# Patient Record
Sex: MALE | Race: WHITE | NOT HISPANIC OR LATINO
[De-identification: names, ages, dates, MRNs, and addresses within clinical notes are randomized per-mention and may not be internally consistent; named-entity substitution may affect disease eponyms.]

---

## 2018-12-22 ENCOUNTER — TRANSCRIPTION ENCOUNTER (OUTPATIENT)
Age: 28
End: 2018-12-22

## 2019-12-25 ENCOUNTER — EMERGENCY (EMERGENCY)
Facility: HOSPITAL | Age: 29
LOS: 1 days | Discharge: HOME | End: 2019-12-25
Attending: EMERGENCY MEDICINE
Payer: COMMERCIAL

## 2019-12-25 VITALS
OXYGEN SATURATION: 100 % | TEMPERATURE: 98 F | SYSTOLIC BLOOD PRESSURE: 117 MMHG | RESPIRATION RATE: 18 BRPM | DIASTOLIC BLOOD PRESSURE: 83 MMHG | HEART RATE: 77 BPM

## 2019-12-25 VITALS
TEMPERATURE: 100 F | OXYGEN SATURATION: 99 % | WEIGHT: 235.01 LBS | HEART RATE: 110 BPM | SYSTOLIC BLOOD PRESSURE: 140 MMHG | RESPIRATION RATE: 20 BRPM | DIASTOLIC BLOOD PRESSURE: 95 MMHG

## 2019-12-25 LAB
ALBUMIN SERPL ELPH-MCNC: 4.8 G/DL — SIGNIFICANT CHANGE UP (ref 3.3–5)
ALP SERPL-CCNC: 124 U/L — HIGH (ref 40–120)
ALT FLD-CCNC: 68 U/L — HIGH (ref 10–45)
ANION GAP SERPL CALC-SCNC: 14 MMOL/L — SIGNIFICANT CHANGE UP (ref 5–17)
AST SERPL-CCNC: 22 U/L — SIGNIFICANT CHANGE UP (ref 10–40)
BASOPHILS # BLD AUTO: 0 K/UL — SIGNIFICANT CHANGE UP (ref 0–0.2)
BASOPHILS NFR BLD AUTO: 0 % — SIGNIFICANT CHANGE UP (ref 0–2)
BILIRUB SERPL-MCNC: 1.1 MG/DL — SIGNIFICANT CHANGE UP (ref 0.2–1.2)
BUN SERPL-MCNC: 22 MG/DL — SIGNIFICANT CHANGE UP (ref 7–23)
CALCIUM SERPL-MCNC: 9.8 MG/DL — SIGNIFICANT CHANGE UP (ref 8.4–10.5)
CHLORIDE SERPL-SCNC: 96 MMOL/L — SIGNIFICANT CHANGE UP (ref 96–108)
CO2 SERPL-SCNC: 25 MMOL/L — SIGNIFICANT CHANGE UP (ref 22–31)
CREAT SERPL-MCNC: 1.07 MG/DL — SIGNIFICANT CHANGE UP (ref 0.5–1.3)
EOSINOPHIL # BLD AUTO: 0 K/UL — SIGNIFICANT CHANGE UP (ref 0–0.5)
EOSINOPHIL NFR BLD AUTO: 0 % — SIGNIFICANT CHANGE UP (ref 0–6)
GLUCOSE SERPL-MCNC: 120 MG/DL — HIGH (ref 70–99)
HCT VFR BLD CALC: 46.9 % — SIGNIFICANT CHANGE UP (ref 39–50)
HGB BLD-MCNC: 14.8 G/DL — SIGNIFICANT CHANGE UP (ref 13–17)
LYMPHOCYTES # BLD AUTO: 4.37 K/UL — HIGH (ref 1–3.3)
LYMPHOCYTES # BLD AUTO: 44 % — SIGNIFICANT CHANGE UP (ref 13–44)
MANUAL SMEAR VERIFICATION: SIGNIFICANT CHANGE UP
MCHC RBC-ENTMCNC: 28.6 PG — SIGNIFICANT CHANGE UP (ref 27–34)
MCHC RBC-ENTMCNC: 31.6 GM/DL — LOW (ref 32–36)
MCV RBC AUTO: 90.5 FL — SIGNIFICANT CHANGE UP (ref 80–100)
MONOCYTES # BLD AUTO: 0.4 K/UL — SIGNIFICANT CHANGE UP (ref 0–0.9)
MONOCYTES NFR BLD AUTO: 4 % — SIGNIFICANT CHANGE UP (ref 2–14)
NEUTROPHILS # BLD AUTO: 4.77 K/UL — SIGNIFICANT CHANGE UP (ref 1.8–7.4)
NEUTROPHILS NFR BLD AUTO: 48 % — SIGNIFICANT CHANGE UP (ref 43–77)
NRBC # BLD: 0 /100 — SIGNIFICANT CHANGE UP (ref 0–0)
PLAT MORPH BLD: NORMAL — SIGNIFICANT CHANGE UP
PLATELET # BLD AUTO: 184 K/UL — SIGNIFICANT CHANGE UP (ref 150–400)
POTASSIUM SERPL-MCNC: 4.2 MMOL/L — SIGNIFICANT CHANGE UP (ref 3.5–5.3)
POTASSIUM SERPL-SCNC: 4.2 MMOL/L — SIGNIFICANT CHANGE UP (ref 3.5–5.3)
PROT SERPL-MCNC: 8.5 G/DL — HIGH (ref 6–8.3)
RBC # BLD: 5.18 M/UL — SIGNIFICANT CHANGE UP (ref 4.2–5.8)
RBC # FLD: 13.7 % — SIGNIFICANT CHANGE UP (ref 10.3–14.5)
RBC BLD AUTO: SIGNIFICANT CHANGE UP
S PYO AG SPEC QL IA: NEGATIVE — SIGNIFICANT CHANGE UP
SODIUM SERPL-SCNC: 135 MMOL/L — SIGNIFICANT CHANGE UP (ref 135–145)
VARIANT LYMPHS # BLD: 4 % — SIGNIFICANT CHANGE UP (ref 0–6)
WBC # BLD: 9.93 K/UL — SIGNIFICANT CHANGE UP (ref 3.8–10.5)
WBC # FLD AUTO: 9.93 K/UL — SIGNIFICANT CHANGE UP (ref 3.8–10.5)

## 2019-12-25 PROCEDURE — 96361 HYDRATE IV INFUSION ADD-ON: CPT

## 2019-12-25 PROCEDURE — 80053 COMPREHEN METABOLIC PANEL: CPT

## 2019-12-25 PROCEDURE — 87081 CULTURE SCREEN ONLY: CPT

## 2019-12-25 PROCEDURE — 85027 COMPLETE CBC AUTOMATED: CPT

## 2019-12-25 PROCEDURE — 99284 EMERGENCY DEPT VISIT MOD MDM: CPT | Mod: 25

## 2019-12-25 PROCEDURE — 96374 THER/PROPH/DIAG INJ IV PUSH: CPT

## 2019-12-25 PROCEDURE — 87880 STREP A ASSAY W/OPTIC: CPT

## 2019-12-25 PROCEDURE — 99284 EMERGENCY DEPT VISIT MOD MDM: CPT

## 2019-12-25 RX ORDER — SODIUM CHLORIDE 9 MG/ML
2000 INJECTION, SOLUTION INTRAVENOUS ONCE
Refills: 0 | Status: COMPLETED | OUTPATIENT
Start: 2019-12-25 | End: 2019-12-25

## 2019-12-25 RX ORDER — ACETAMINOPHEN 500 MG
975 TABLET ORAL ONCE
Refills: 0 | Status: COMPLETED | OUTPATIENT
Start: 2019-12-25 | End: 2019-12-25

## 2019-12-25 RX ORDER — KETOROLAC TROMETHAMINE 30 MG/ML
15 SYRINGE (ML) INJECTION ONCE
Refills: 0 | Status: DISCONTINUED | OUTPATIENT
Start: 2019-12-25 | End: 2019-12-25

## 2019-12-25 RX ADMIN — SODIUM CHLORIDE 2000 MILLILITER(S): 9 INJECTION, SOLUTION INTRAVENOUS at 14:40

## 2019-12-25 RX ADMIN — Medication 15 MILLIGRAM(S): at 14:40

## 2019-12-25 RX ADMIN — Medication 975 MILLIGRAM(S): at 14:40

## 2019-12-25 RX ADMIN — Medication 15 MILLIGRAM(S): at 13:43

## 2019-12-25 RX ADMIN — SODIUM CHLORIDE 2000 MILLILITER(S): 9 INJECTION, SOLUTION INTRAVENOUS at 11:10

## 2019-12-25 RX ADMIN — Medication 975 MILLIGRAM(S): at 13:43

## 2019-12-25 NOTE — ED PROVIDER NOTE - NSFOLLOWUPINSTRUCTIONS_ED_ALL_ED_FT
Please return immediately if you have worsening sore throat, are unable to eat or drink, unable to move your neck or further concerns  You can take tylenol 975mg every 8 hours and motrin 600mg every 6-8 hours  Please avoid any contact sports until you are cleared by your doctor  Please return if you start to drool

## 2019-12-25 NOTE — ED PROVIDER NOTE - OBJECTIVE STATEMENT
Attending Natasha Escobar: 30 y/o male without sig pmh presenting with sore throat. pt has been having symptoms last 7-8 days. was tested for mono and reportedly positive. pt states also have u/s have spleen and reportedly enlarged. pt has been taking prednisone for sore throat. reportedly had elevated lft's. has not been takingtylenol and motrin. pt states worsening pain and fatigue as well as sore throat. over last 24 hours difficulty drinking secondary to pain. no sick contacts. no joint pain. no cough

## 2019-12-25 NOTE — ED PROVIDER NOTE - ATTENDING CONTRIBUTION TO CARE
Attending MD Natasha Escobar:  I personally have seen and examined this patient.  Resident note reviewed and agree on plan of care and except where noted.  See HPI, PE, and MDM for details.

## 2019-12-25 NOTE — ED PROVIDER NOTE - CLINICAL SUMMARY MEDICAL DECISION MAKING FREE TEXT BOX
Attending Natasha Escobar: 21 y/o male recently diagnosed with mono on steroids presenting with sore throat and not feeling well. upon arrival pt febrile and initially tachcardic. on exam op pink, uvula midline, no trismus and pt handlling secretions. unlikely ludwigs. no evidence of pta on exam or pocus. pt does have lymphadenopathy. was recently diagonsed with EBV which is likley causing symptoms. consider developing pharyngitis. pt given antipyretic and hydration. onsteroids currently. felt improved in the ed. given close return precautions for any worsneing sore throat, hoarseness or neck pain. less likely deep tissue abscess as pt able to range neck

## 2019-12-25 NOTE — ED PROVIDER NOTE - PHYSICAL EXAMINATION
Attending Natasha Escobar: Gen: NAD, heent: atrauamtic, eomi, perrla, dry mucous membranes, op pink, b/l tonsilar erythema with exudate, no trismus. no tongue elevation, op pink, uvula midline, neck; nttp, no nuchal rigidity, chest: nttp, no crepitus, cv: rrr, no murmurs, lungs: ctab, abd: soft, nontender, nondistended, no peritoneal signs, +BS, no guarding, ext: wwp, neg homans, skin: no rash, neuro: awake and alert, following commands, speech clear, sensation and strength intact, no focal deficits

## 2019-12-25 NOTE — ED PROVIDER NOTE - PATIENT PORTAL LINK FT
You can access the FollowMyHealth Patient Portal offered by [facility] by registering at the following website: [email or web address]. By joining [facility]’s FollowMyHealth portal, you will also be able to view your health information using other applications (apps) compatible with our system.

## 2019-12-25 NOTE — ED PROVIDER NOTE - CARE PROVIDER_API CALL
Eamon Walsh (MD)  Otolaryngology  [address] [place] [address], [facility] [address]  [place] [address]  Phone: [phone]  Fax: [phone]  Follow Up Time:

## 2019-12-25 NOTE — ED PROVIDER NOTE - PROGRESS NOTE DETAILS
Attending Natasha Escobar: pt feeling better. handling secretions. lft's improved from prior. will give close return precautions for any worsening neck pain, difficulty swallowing or neck swelling

## 2019-12-25 NOTE — ED ADULT NURSE NOTE - OBJECTIVE STATEMENT
30 y/o male c/o sore throat x 8-9 days with earache left > right.   Pt reports he was recently diagnosed with mono.   No fever, no chills, no chest pain, no SOB, no abd pain.  Respiration easy and non labored.  No acute respiratory distress noted.

## 2020-12-22 PROBLEM — Z00.00 ENCOUNTER FOR PREVENTIVE HEALTH EXAMINATION: Status: ACTIVE | Noted: 2020-12-22

## 2021-01-15 ENCOUNTER — APPOINTMENT (OUTPATIENT)
Dept: SURGERY | Facility: CLINIC | Age: 31
End: 2021-01-15
Payer: COMMERCIAL

## 2021-01-15 VITALS
BODY MASS INDEX: 32.51 KG/M2 | HEIGHT: 72 IN | TEMPERATURE: 97.9 F | SYSTOLIC BLOOD PRESSURE: 128 MMHG | OXYGEN SATURATION: 99 % | DIASTOLIC BLOOD PRESSURE: 89 MMHG | HEART RATE: 72 BPM | RESPIRATION RATE: 16 BRPM | WEIGHT: 240 LBS

## 2021-01-15 DIAGNOSIS — K62.5 HEMORRHAGE OF ANUS AND RECTUM: ICD-10-CM

## 2021-01-15 DIAGNOSIS — Z80.0 FAMILY HISTORY OF MALIGNANT NEOPLASM OF DIGESTIVE ORGANS: ICD-10-CM

## 2021-01-15 DIAGNOSIS — Z86.69 PERSONAL HISTORY OF OTHER DISEASES OF THE NERVOUS SYSTEM AND SENSE ORGANS: ICD-10-CM

## 2021-01-15 DIAGNOSIS — Z78.9 OTHER SPECIFIED HEALTH STATUS: ICD-10-CM

## 2021-01-15 DIAGNOSIS — Z83.79 FAMILY HISTORY OF OTHER DISEASES OF THE DIGESTIVE SYSTEM: ICD-10-CM

## 2021-01-15 DIAGNOSIS — Z83.3 FAMILY HISTORY OF DIABETES MELLITUS: ICD-10-CM

## 2021-01-15 PROCEDURE — 46600 DIAGNOSTIC ANOSCOPY SPX: CPT

## 2021-01-15 PROCEDURE — 99213 OFFICE O/P EST LOW 20 MIN: CPT | Mod: 25

## 2021-01-15 PROCEDURE — 99072 ADDL SUPL MATRL&STAF TM PHE: CPT

## 2021-01-15 RX ORDER — CHROMIUM 200 MCG
TABLET ORAL
Refills: 0 | Status: ACTIVE | COMMUNITY

## 2021-01-15 RX ORDER — SERTRALINE HYDROCHLORIDE 25 MG/1
TABLET, FILM COATED ORAL
Refills: 0 | Status: ACTIVE | COMMUNITY

## 2021-01-15 NOTE — CONSULT LETTER
[Dear  ___] : Dear  [unfilled], [Consult Letter:] : I had the pleasure of evaluating your patient, [unfilled]. [Please see my note below.] : Please see my note below. [Consult Closing:] : Thank you very much for allowing me to participate in the care of this patient.  If you have any questions, please do not hesitate to contact me. [Sincerely,] : Sincerely, [FreeTextEntry3] : Betito Crawley M.D., F.A.C.S, F.A.S.C.R.S [DrDawood  ___] : Dr. GAMINO

## 2021-01-15 NOTE — PHYSICAL EXAM
[Abdomen Tenderness] : ~T No ~M abdominal tenderness [Wheezing] : no wheezing was heard [Normal Rate and Rhythm] : normal rate and rhythm [No Rash or Lesion] : No rash or lesion [Alert] : alert [Calm] : calm [de-identified] : Perianal inspection reveals no evidence of fissure fistula or abscess.  There are no enlarged external hemorrhoids.  Digital rectal examination reveals no focal tenderness.  Anoscopy reveals large friable internal hemorrhoids largest in the right posterior and left lateral positions.  Distal rectal mucosa is otherwise normal. [de-identified] : nl [de-identified] : nad

## 2021-01-15 NOTE — ASSESSMENT
[FreeTextEntry1] : In summary the patient has intermittent rectal bleeding which is seems to be resolving.  He also had anal pain which resolved.  His bleeding is likely from his internal hemorrhoids and I recommended rubber band ligations if his bleeding persists.  He should also undergo screening colonoscopy by Dr. Foster as he has a strong family history of colorectal cancer and his last colonoscopy was 7 years ago.

## 2021-01-15 NOTE — REASON FOR VISIT
[Consultation] : a consultation visit [FreeTextEntry1] : referred by Dr. Mejia Foster for rectal bleeding.

## 2021-01-15 NOTE — HISTORY OF PRESENT ILLNESS
[FreeTextEntry1] : Juan is a 29 y/o male here for consultation visit for rectal bleeding on TP since beginning of December. Reports has BMs twice daily varying in form - pain during December but resolved.\par \par Colonoscopy from 2014 demonstrated normal terminal ileum. Inflammation in the rectum. Hemorrhoids.

## 2021-11-24 NOTE — ED PROVIDER NOTE - CARE PROVIDERS DIRECT ADDRESSES
[email or web address] Patient requests all Lab, Cardiology, and Radiology Results on their Discharge Instructions

## 2023-06-06 ENCOUNTER — INPATIENT (INPATIENT)
Facility: HOSPITAL | Age: 33
LOS: 0 days | Discharge: HOME | DRG: 343 | End: 2023-06-07
Attending: SURGERY | Admitting: SURGERY
Payer: COMMERCIAL

## 2023-06-06 ENCOUNTER — TRANSCRIPTION ENCOUNTER (OUTPATIENT)
Age: 33
End: 2023-06-06

## 2023-06-06 VITALS
HEART RATE: 73 BPM | TEMPERATURE: 98 F | OXYGEN SATURATION: 97 % | RESPIRATION RATE: 17 BRPM | SYSTOLIC BLOOD PRESSURE: 128 MMHG | DIASTOLIC BLOOD PRESSURE: 86 MMHG

## 2023-06-06 DIAGNOSIS — K35.80 UNSPECIFIED ACUTE APPENDICITIS: ICD-10-CM

## 2023-06-06 LAB
ALBUMIN SERPL ELPH-MCNC: 5.6 G/DL — HIGH (ref 3.3–5)
ALP SERPL-CCNC: 55 U/L — SIGNIFICANT CHANGE UP (ref 40–120)
ALT FLD-CCNC: 13 U/L — SIGNIFICANT CHANGE UP (ref 10–45)
ANION GAP SERPL CALC-SCNC: 18 MMOL/L — HIGH (ref 5–17)
APTT BLD: 35.2 SEC — SIGNIFICANT CHANGE UP (ref 27.5–35.5)
AST SERPL-CCNC: 22 U/L — SIGNIFICANT CHANGE UP (ref 10–40)
BASE EXCESS BLDV CALC-SCNC: -1.4 MMOL/L — SIGNIFICANT CHANGE UP (ref -2–3)
BASOPHILS # BLD AUTO: 0.02 K/UL — SIGNIFICANT CHANGE UP (ref 0–0.2)
BASOPHILS NFR BLD AUTO: 0.3 % — SIGNIFICANT CHANGE UP (ref 0–2)
BILIRUB SERPL-MCNC: 0.9 MG/DL — SIGNIFICANT CHANGE UP (ref 0.2–1.2)
BLD GP AB SCN SERPL QL: NEGATIVE — SIGNIFICANT CHANGE UP
BUN SERPL-MCNC: 16 MG/DL — SIGNIFICANT CHANGE UP (ref 7–23)
CA-I SERPL-SCNC: 1.23 MMOL/L — SIGNIFICANT CHANGE UP (ref 1.15–1.33)
CALCIUM SERPL-MCNC: 9.7 MG/DL — SIGNIFICANT CHANGE UP (ref 8.4–10.5)
CHLORIDE BLDV-SCNC: 101 MMOL/L — SIGNIFICANT CHANGE UP (ref 96–108)
CHLORIDE SERPL-SCNC: 100 MMOL/L — SIGNIFICANT CHANGE UP (ref 96–108)
CO2 BLDV-SCNC: 28 MMOL/L — HIGH (ref 22–26)
CO2 SERPL-SCNC: 20 MMOL/L — LOW (ref 22–31)
CREAT SERPL-MCNC: 0.99 MG/DL — SIGNIFICANT CHANGE UP (ref 0.5–1.3)
EGFR: 103 ML/MIN/1.73M2 — SIGNIFICANT CHANGE UP
EOSINOPHIL # BLD AUTO: 0.06 K/UL — SIGNIFICANT CHANGE UP (ref 0–0.5)
EOSINOPHIL NFR BLD AUTO: 0.8 % — SIGNIFICANT CHANGE UP (ref 0–6)
GAS PNL BLDV: 134 MMOL/L — LOW (ref 136–145)
GAS PNL BLDV: SIGNIFICANT CHANGE UP
GAS PNL BLDV: SIGNIFICANT CHANGE UP
GLUCOSE BLDV-MCNC: 95 MG/DL — SIGNIFICANT CHANGE UP (ref 70–99)
GLUCOSE SERPL-MCNC: 92 MG/DL — SIGNIFICANT CHANGE UP (ref 70–99)
HCO3 BLDV-SCNC: 26 MMOL/L — SIGNIFICANT CHANGE UP (ref 22–29)
HCT VFR BLD CALC: 48 % — SIGNIFICANT CHANGE UP (ref 39–50)
HCT VFR BLDA CALC: 49 % — SIGNIFICANT CHANGE UP (ref 39–51)
HGB BLD CALC-MCNC: 16.4 G/DL — SIGNIFICANT CHANGE UP (ref 12.6–17.4)
HGB BLD-MCNC: 15.8 G/DL — SIGNIFICANT CHANGE UP (ref 13–17)
IMM GRANULOCYTES NFR BLD AUTO: 0.3 % — SIGNIFICANT CHANGE UP (ref 0–0.9)
INR BLD: 1.03 RATIO — SIGNIFICANT CHANGE UP (ref 0.88–1.16)
LACTATE BLDV-MCNC: 1.7 MMOL/L — SIGNIFICANT CHANGE UP (ref 0.5–2)
LIDOCAIN IGE QN: 23 U/L — SIGNIFICANT CHANGE UP (ref 7–60)
LYMPHOCYTES # BLD AUTO: 2.27 K/UL — SIGNIFICANT CHANGE UP (ref 1–3.3)
LYMPHOCYTES # BLD AUTO: 31.1 % — SIGNIFICANT CHANGE UP (ref 13–44)
MCHC RBC-ENTMCNC: 29.3 PG — SIGNIFICANT CHANGE UP (ref 27–34)
MCHC RBC-ENTMCNC: 32.9 GM/DL — SIGNIFICANT CHANGE UP (ref 32–36)
MCV RBC AUTO: 89.1 FL — SIGNIFICANT CHANGE UP (ref 80–100)
MONOCYTES # BLD AUTO: 0.55 K/UL — SIGNIFICANT CHANGE UP (ref 0–0.9)
MONOCYTES NFR BLD AUTO: 7.5 % — SIGNIFICANT CHANGE UP (ref 2–14)
NEUTROPHILS # BLD AUTO: 4.37 K/UL — SIGNIFICANT CHANGE UP (ref 1.8–7.4)
NEUTROPHILS NFR BLD AUTO: 60 % — SIGNIFICANT CHANGE UP (ref 43–77)
NRBC # BLD: 0 /100 WBCS — SIGNIFICANT CHANGE UP (ref 0–0)
PCO2 BLDV: 55 MMHG — SIGNIFICANT CHANGE UP (ref 42–55)
PH BLDV: 7.29 — LOW (ref 7.32–7.43)
PLATELET # BLD AUTO: 181 K/UL — SIGNIFICANT CHANGE UP (ref 150–400)
PO2 BLDV: 36 MMHG — SIGNIFICANT CHANGE UP (ref 25–45)
POTASSIUM BLDV-SCNC: 5.1 MMOL/L — SIGNIFICANT CHANGE UP (ref 3.5–5.1)
POTASSIUM SERPL-MCNC: 4.7 MMOL/L — SIGNIFICANT CHANGE UP (ref 3.5–5.3)
POTASSIUM SERPL-SCNC: 4.7 MMOL/L — SIGNIFICANT CHANGE UP (ref 3.5–5.3)
PROT SERPL-MCNC: 8.3 G/DL — SIGNIFICANT CHANGE UP (ref 6–8.3)
PROTHROM AB SERPL-ACNC: 12 SEC — SIGNIFICANT CHANGE UP (ref 10.5–13.4)
RBC # BLD: 5.39 M/UL — SIGNIFICANT CHANGE UP (ref 4.2–5.8)
RBC # FLD: 13 % — SIGNIFICANT CHANGE UP (ref 10.3–14.5)
RH IG SCN BLD-IMP: POSITIVE — SIGNIFICANT CHANGE UP
SAO2 % BLDV: 56.6 % — LOW (ref 67–88)
SODIUM SERPL-SCNC: 138 MMOL/L — SIGNIFICANT CHANGE UP (ref 135–145)
WBC # BLD: 7.29 K/UL — SIGNIFICANT CHANGE UP (ref 3.8–10.5)
WBC # FLD AUTO: 7.29 K/UL — SIGNIFICANT CHANGE UP (ref 3.8–10.5)

## 2023-06-06 PROCEDURE — 99285 EMERGENCY DEPT VISIT HI MDM: CPT

## 2023-06-06 RX ORDER — SODIUM CHLORIDE 9 MG/ML
1000 INJECTION INTRAMUSCULAR; INTRAVENOUS; SUBCUTANEOUS ONCE
Refills: 0 | Status: COMPLETED | OUTPATIENT
Start: 2023-06-06 | End: 2023-06-06

## 2023-06-06 RX ORDER — ERTAPENEM SODIUM 1 G/1
1000 INJECTION, POWDER, LYOPHILIZED, FOR SOLUTION INTRAMUSCULAR; INTRAVENOUS ONCE
Refills: 0 | Status: COMPLETED | OUTPATIENT
Start: 2023-06-06 | End: 2023-06-06

## 2023-06-06 RX ORDER — SODIUM CHLORIDE 9 MG/ML
1000 INJECTION, SOLUTION INTRAVENOUS
Refills: 0 | Status: DISCONTINUED | OUTPATIENT
Start: 2023-06-06 | End: 2023-06-07

## 2023-06-06 RX ORDER — ACETAMINOPHEN 500 MG
1000 TABLET ORAL EVERY 6 HOURS
Refills: 0 | Status: DISCONTINUED | OUTPATIENT
Start: 2023-06-06 | End: 2023-06-07

## 2023-06-06 RX ADMIN — SODIUM CHLORIDE 1000 MILLILITER(S): 9 INJECTION INTRAMUSCULAR; INTRAVENOUS; SUBCUTANEOUS at 22:04

## 2023-06-06 RX ADMIN — ERTAPENEM SODIUM 120 MILLIGRAM(S): 1 INJECTION, POWDER, LYOPHILIZED, FOR SOLUTION INTRAMUSCULAR; INTRAVENOUS at 22:04

## 2023-06-06 NOTE — ED PROVIDER NOTE - ATTENDING CONTRIBUTION TO CARE
Private Physician Mitchell Foster pcp,   33y male pmh, Anxiety, No dm,htn,cancer,cad,tobacco,abd surg. Pt comes to ed c/o abd pain onset two days ago. Seen at pmd yesterday. had ct today showing acute appy. Feels mild improved. No fever, anorexia. last meal 1230pm PE WDWN male awake alert normocephalic atraumatic Private Physician Mitchell Foster pcp,   33y male pmh, Anxiety, No dm,htn,cancer,cad,tobacco,abd surg. Pt comes to ed c/o abd pain onset two days ago. Seen at pmd yesterday. had ct today showing acute appy. Feels mild improved. No fever, anorexia. last meal 1230pm PE WDWN male awake alert normocephalic atraumatic Chest clear cv no rubs, gallops or murmurs, Abd mild rlq ttp w mild rebound, neuro no focal defects  Betito Torres MD, Facep

## 2023-06-06 NOTE — ED PROVIDER NOTE - CLINICAL SUMMARY MEDICAL DECISION MAKING FREE TEXT BOX
33-year-old male with no past medical history presenting with abdominal pain x2 days, found to have acute appendicitis on outpatient imaging today; afebrile, hemodynamically stable, mild tenderness right lower quadrant. Will start empiric antibiotics, obtain preop labs, and consult surgery. Patient does not require pain medication at this time: We will continue to reassess

## 2023-06-06 NOTE — ED PROVIDER NOTE - OBJECTIVE STATEMENT
33-year-old male with no past medical history presenting with abdominal pain x2 days.  Localizes to right lower quadrant, went for outpatient imaging today found to have acute appendicitis.  Sent to ER for further evaluation.  Denies any fever/chills, nausea/vomiting/diarrhea, cough, rashes, or changes in urination.  No history of any abdominal surgeries.  Denies any allergies.

## 2023-06-06 NOTE — ED PROVIDER NOTE - PHYSICAL EXAMINATION
Gen: WDWN, NAD, comfortable appearing, afebrile, hemodynamically stable   HEENT: No nasal discharge, mucous membranes moist, no oropharyngeal edema/erythema/exudates   CV: RRR, +S1/S2, no M/R/G, equal b/l radial pulses 2+  Resp: CTAB, no W/R/R, SPO2 >95% on RA, no increased WOB   GI: Abdomen soft non-distended, TTP in RLQ with no rebound/guarding no masses/organomegaly   MSK/Skin: No CVA tenderness, no open wounds, no bruising, no LE edema  Neuro: A&Ox4, moving all 4 extremities spontaneously, gross sensation intact in UE and LE BL  Psych: appropriate mood

## 2023-06-06 NOTE — ED ADULT NURSE NOTE - OBJECTIVE STATEMENT
Pt is a 33y M c/o abd pain. Pt is well appearing, A&Ox3, neuro status intact, breathing unlabored and spontaneous, abd nondistended, full strength and ROM of all extremities. Pt resting in stretcher, bed in lowest position, aware of plan of care. Comfort and safety measures maintained. Pt is a 33y M c/o RLQ abd pain x 2 days. Pt states he was seen outpatient for pain, had imaging done, per pt imaging showed acute appendicitis, was sent to ED for further evaluation. Pt denies fever, chills, cough, sob, cp, NVD, urinary symptoms. Pt is well appearing, A&Ox3, neuro status intact, breathing unlabored and spontaneous, abd nondistended, full strength and ROM of all extremities. Pt resting in stretcher, bed in lowest position, aware of plan of care. Comfort and safety measures maintained.

## 2023-06-06 NOTE — ED ADULT NURSE NOTE - NSFALLUNIVINTERV_ED_ALL_ED
Bed/Stretcher in lowest position, wheels locked, appropriate side rails in place/Call bell, personal items and telephone in reach/Instruct patient to call for assistance before getting out of bed/chair/stretcher/Non-slip footwear applied when patient is off stretcher/[place] to call system/Physically safe environment - no spills, clutter or unnecessary equipment/Purposeful proactive rounding/Room/bathroom lighting operational, light cord in reach

## 2023-06-07 ENCOUNTER — TRANSCRIPTION ENCOUNTER (OUTPATIENT)
Age: 33
End: 2023-06-07

## 2023-06-07 ENCOUNTER — RESULT REVIEW (OUTPATIENT)
Age: 33
End: 2023-06-07

## 2023-06-07 VITALS
RESPIRATION RATE: 16 BRPM | SYSTOLIC BLOOD PRESSURE: 119 MMHG | DIASTOLIC BLOOD PRESSURE: 80 MMHG | TEMPERATURE: 98 F | OXYGEN SATURATION: 97 % | HEART RATE: 77 BPM

## 2023-06-07 PROCEDURE — 80053 COMPREHEN METABOLIC PANEL: CPT

## 2023-06-07 PROCEDURE — 82565 ASSAY OF CREATININE: CPT

## 2023-06-07 PROCEDURE — 83690 ASSAY OF LIPASE: CPT

## 2023-06-07 PROCEDURE — 85025 COMPLETE CBC W/AUTO DIFF WBC: CPT

## 2023-06-07 PROCEDURE — C1889: CPT

## 2023-06-07 PROCEDURE — 85610 PROTHROMBIN TIME: CPT

## 2023-06-07 PROCEDURE — 85730 THROMBOPLASTIN TIME PARTIAL: CPT

## 2023-06-07 PROCEDURE — 88304 TISSUE EXAM BY PATHOLOGIST: CPT | Mod: 26

## 2023-06-07 PROCEDURE — 82947 ASSAY GLUCOSE BLOOD QUANT: CPT

## 2023-06-07 PROCEDURE — 83605 ASSAY OF LACTIC ACID: CPT

## 2023-06-07 PROCEDURE — 82330 ASSAY OF CALCIUM: CPT

## 2023-06-07 PROCEDURE — 86850 RBC ANTIBODY SCREEN: CPT

## 2023-06-07 PROCEDURE — C9399: CPT

## 2023-06-07 PROCEDURE — 86900 BLOOD TYPING SEROLOGIC ABO: CPT

## 2023-06-07 PROCEDURE — 86901 BLOOD TYPING SEROLOGIC RH(D): CPT

## 2023-06-07 PROCEDURE — 85018 HEMOGLOBIN: CPT

## 2023-06-07 PROCEDURE — 99285 EMERGENCY DEPT VISIT HI MDM: CPT | Mod: 25

## 2023-06-07 PROCEDURE — 84295 ASSAY OF SERUM SODIUM: CPT

## 2023-06-07 PROCEDURE — 82803 BLOOD GASES ANY COMBINATION: CPT

## 2023-06-07 PROCEDURE — 85014 HEMATOCRIT: CPT

## 2023-06-07 PROCEDURE — 96374 THER/PROPH/DIAG INJ IV PUSH: CPT

## 2023-06-07 PROCEDURE — 84132 ASSAY OF SERUM POTASSIUM: CPT

## 2023-06-07 PROCEDURE — 88304 TISSUE EXAM BY PATHOLOGIST: CPT

## 2023-06-07 PROCEDURE — 82435 ASSAY OF BLOOD CHLORIDE: CPT

## 2023-06-07 DEVICE — CLIP APPLIER ETHICON LIGAMAX 5MM: Type: IMPLANTABLE DEVICE | Status: FUNCTIONAL

## 2023-06-07 DEVICE — STAPLER COVIDIEN TRI-STAPLE 60MM TAN RELOAD: Type: IMPLANTABLE DEVICE | Status: FUNCTIONAL

## 2023-06-07 RX ORDER — ACETAMINOPHEN 500 MG
975 TABLET ORAL ONCE
Refills: 0 | Status: DISCONTINUED | OUTPATIENT
Start: 2023-06-07 | End: 2023-06-07

## 2023-06-07 RX ORDER — ERTAPENEM SODIUM 1 G/1
1000 INJECTION, POWDER, LYOPHILIZED, FOR SOLUTION INTRAMUSCULAR; INTRAVENOUS EVERY 24 HOURS
Refills: 0 | Status: DISCONTINUED | OUTPATIENT
Start: 2023-06-07 | End: 2023-06-07

## 2023-06-07 RX ORDER — SODIUM CHLORIDE 9 MG/ML
1000 INJECTION, SOLUTION INTRAVENOUS
Refills: 0 | Status: DISCONTINUED | OUTPATIENT
Start: 2023-06-07 | End: 2023-06-07

## 2023-06-07 RX ORDER — OXYCODONE HYDROCHLORIDE 5 MG/1
5 TABLET ORAL EVERY 4 HOURS
Refills: 0 | Status: DISCONTINUED | OUTPATIENT
Start: 2023-06-07 | End: 2023-06-07

## 2023-06-07 RX ORDER — ONDANSETRON 8 MG/1
4 TABLET, FILM COATED ORAL ONCE
Refills: 0 | Status: DISCONTINUED | OUTPATIENT
Start: 2023-06-07 | End: 2023-06-07

## 2023-06-07 RX ORDER — OXYCODONE HYDROCHLORIDE 5 MG/1
1 TABLET ORAL
Qty: 8 | Refills: 0
Start: 2023-06-07

## 2023-06-07 RX ORDER — HYDROMORPHONE HYDROCHLORIDE 2 MG/ML
0.5 INJECTION INTRAMUSCULAR; INTRAVENOUS; SUBCUTANEOUS
Refills: 0 | Status: DISCONTINUED | OUTPATIENT
Start: 2023-06-07 | End: 2023-06-07

## 2023-06-07 RX ADMIN — HYDROMORPHONE HYDROCHLORIDE 0.5 MILLIGRAM(S): 2 INJECTION INTRAMUSCULAR; INTRAVENOUS; SUBCUTANEOUS at 08:49

## 2023-06-07 RX ADMIN — SODIUM CHLORIDE 125 MILLILITER(S): 9 INJECTION, SOLUTION INTRAVENOUS at 01:07

## 2023-06-07 NOTE — H&P ADULT - ASSESSMENT
Assessment:  33 year old man with acute uncomplicated appendicitis without appendicolith.     Plan:  - Admit to surgery, plan for lap appy  - IV abx ertapenem   - NPO  - IVF LR @125  - D/w Attending Dr. Taylor Lebron MD, PGY2  [facility]   [id number]

## 2023-06-07 NOTE — ASU DISCHARGE PLAN (ADULT/PEDIATRIC) - ASU DC SPECIAL INSTRUCTIONSFT
WOUND CARE: Please do not remove your steri-strips. You may shower. Pat Dry abdomen. Leave the white steri strips in place, they will fall off on their own in approximately 5-7 days.    BATHING: Please do not submerge wound underwater. You may shower and/or sponge bathe at 24 hours after discharge.     PAIN: A prescription for oxycodone has been sent to the pharmacy. You should only take these for severe pain. For mild or moderate pain, you may take 975mg of tylenol every 6 hours. Do not exceed more than 4G per day. You may also take ibuprofen 400mg every 6 hours. It is most effective to alternate these two medicines with each other, 3 hours apart.    ACTIVITY: No heavy lifting of anything more than 10-15 pounds or anything that will cause straining. Otherwise, you may return to your usual level of physical activity. If you are taking narcotic pain medication (such as Percocet or Oxycodone), do NOT drive a car, operate machinery, or make important decisions.     DIET: Please return to your usual diet.    NOTIFY YOUR SURGEON: If you have any bleeding that does not stop, any pus draining from your wound, any fever (over 100.4) or chills, persistent nausea/vomiting with inability to tolerate food or liquids, persistent diarrhea, or if your pain is not controlled on your discharge pain medications.     FOLLOW-UP:   1. Please make a follow-up appointment with Dr. Vazquez within 2 weeks of discharge.   2. Please follow-up with your primary care physician regarding your hospitalization.

## 2023-06-07 NOTE — H&P ADULT - NSHPPHYSICALEXAM_GEN_ALL_CORE
Gen: NAD, well nourished   HEENT: EOMI, anicteric sclerae  Resp: room air, no increased WOB  Cardiac: RRR, well perfused   Abd: tender RLQ to palpation, voluntary guarding, no rebound, neg rovsing, neg obturator  Extremities: VELÁSQUEZ x4 spontaneously

## 2023-06-07 NOTE — H&P ADULT - NSHPLABSRESULTS_GEN_ALL_CORE
15.8   7.29  )-----------( 181      ( 06 Jun 2023 22:05 )             48.0   06-06    138  |  100  |  16  ----------------------------<  92  4.7   |  20<L>  |  0.99    Ca    9.7      06 Jun 2023 22:05    TPro  8.3  /  Alb  5.6<H>  /  TBili  0.9  /  DBili  x   /  AST  22  /  ALT  13  /  AlkPhos  55  06-06

## 2023-06-07 NOTE — BRIEF OPERATIVE NOTE - OPERATION/FINDINGS
Lap appy. Infraumbilical incision. Bora entry. 5mm ports placed in LLQ and suprapubic regions. Acutely inflamed appendix noted in RLQ. No e/o perforation. Mesoappendix taken w/ LigaSure device. Base of appendix taken w/ 45mm Tan Endo GEREMIAS stapler. Specimen placed in Endo catch bag. Staple line hemostatic. 5mm ports removed under direct visualization, hemostatic. Bora port removed and specimen removed from abdomen. Fascia closed w/ 0 Vicryl figure of eight suture. Skin closed w/ 4-0 Monocryl. Steristrips applied over incisions.

## 2023-06-07 NOTE — H&P ADULT - HISTORY OF PRESENT ILLNESS
HPI:   33 year old man with rlq abdominal pain x3 days.  Began on left side of umbilicus, migrated to RLQ.  Crampy in nature, moderate severity.  Tolerating PO, no anorexia, loose bm x1 today, nonbloody. Has hx of hemorrhoids, has had 2 c-scope both showed just hemorrhoids.  No fever, does have subjective chills, no dysuria or cough.  Has outpatient CT scan reporting appendicitis, images reviewed and uploaded, read reviewed.

## 2023-06-07 NOTE — PRE-ANESTHESIA EVALUATION ADULT - NSANTHOSAYNRD_GEN_A_CORE
No. EBONI screening performed.  STOP BANG Legend: 0-2 = LOW Risk; 3-4 = INTERMEDIATE Risk; 5-8 = HIGH Risk

## 2023-06-07 NOTE — ASU DISCHARGE PLAN (ADULT/PEDIATRIC) - CARE PROVIDER_API CALL
Calling to update patient. No answer, left VM to return call to office.    Vinicio Vazquez  Surgery  [address] [place] [address], [facility] [address]  [place], [place] [id number]  Phone: [phone]  Fax: [phone]  Follow Up Time: 2 weeks

## 2023-06-07 NOTE — H&P ADULT - ATTENDING COMMENTS
33M with acute appendicitis  TTP RLQ  afebrile, VSS  WBC nl  CT reviewed    Discussed risks/benefits to surgery vs abx tx  Pt elects to proceed with appendectomy    Vinicio Vazquez MD

## 2023-06-07 NOTE — ASU DISCHARGE PLAN (ADULT/PEDIATRIC) - NS MD DC FALL RISK RISK
For information on Fall & Injury Prevention, visit: [email or web address] OR  [email or web address] OR  https://www.cdc.gov/steadi/patient.html

## 2023-06-16 LAB — SURGICAL PATHOLOGY STUDY: SIGNIFICANT CHANGE UP

## 2023-06-19 ENCOUNTER — APPOINTMENT (OUTPATIENT)
Dept: SURGERY | Facility: CLINIC | Age: 33
End: 2023-06-19
Payer: COMMERCIAL

## 2023-06-19 VITALS
TEMPERATURE: 97.1 F | OXYGEN SATURATION: 100 % | BODY MASS INDEX: 32.95 KG/M2 | RESPIRATION RATE: 16 BRPM | HEIGHT: 72 IN | WEIGHT: 243.31 LBS | SYSTOLIC BLOOD PRESSURE: 136 MMHG | HEART RATE: 59 BPM | DIASTOLIC BLOOD PRESSURE: 95 MMHG

## 2023-06-19 DIAGNOSIS — Z90.49 ACQUIRED ABSENCE OF OTHER SPECIFIED PARTS OF DIGESTIVE TRACT: ICD-10-CM

## 2023-06-19 PROCEDURE — 99024 POSTOP FOLLOW-UP VISIT: CPT

## 2023-06-19 NOTE — HISTORY OF PRESENT ILLNESS
[de-identified] : Juan is a 33-year-old male who presents for initial postoperative visit status post emergent laparoscopic appendectomy on June 7, 2023.\par He is recovering uneventfully, reporting no incisional pain, tolerating regular diet with normal appetite, and having normal bowel function.  He has had no fevers or chills.  He feels well.

## 2023-06-19 NOTE — PHYSICAL EXAM
[No Rash or Lesion] : No rash or lesion [de-identified] : Well-appearing, no acute distress [de-identified] : Normal respirations [de-identified] : Soft, nontender, nondistended.  Incisions well-healed without erythema or drainage

## 2023-06-19 NOTE — ASSESSMENT
[FreeTextEntry1] : 33-year-old male recovering uneventfully status post laparoscopic appendectomy for acute appendicitis.\par \par Pathology reviewed with the patient, which was benign.

## 2023-07-05 NOTE — HISTORY OF PRESENT ILLNESS
[FreeTextEntry1] : Juan is a 33 yr. old male is here for follow up for rectal bleed/hemorrhoids\par \par Colonoscopy 5/9/22 - 1. Patchy rectal erythema, Bx obtained. 2. The colon mucosa was otherwise normal. 3. Internal grade1 hemorrhoid\par Colonoscopy from 2014 demonstrated normal terminal ileum. Inflammation in the rectum. Hemorrhoids. \par \par Last seen 1/15/2021 -  patient has intermittent rectal bleeding which is seems to be resolving. He also had anal pain which resolved. His bleeding is likely from his internal hemorrhoids and I recommended rubber band ligations if his bleeding persists. He should also undergo screening colonoscopy by Dr. Foster as he has a strong family history of colorectal cancer and his last colonoscopy was 7 years ago. \par \par

## 2023-07-11 ENCOUNTER — APPOINTMENT (OUTPATIENT)
Dept: SURGERY | Facility: CLINIC | Age: 33
End: 2023-07-11

## 2023-08-18 ENCOUNTER — APPOINTMENT (OUTPATIENT)
Dept: SURGERY | Facility: CLINIC | Age: 33
End: 2023-08-18

## 2024-01-08 NOTE — PACU DISCHARGE NOTE - NSPTMEETSDISCHCRITERIADT_GEN_A_CORE
07-Jun-2023 10:33 Quality 226: Preventive Care And Screening: Tobacco Use: Screening And Cessation Intervention: Patient screened for tobacco use and is an ex/non-smoker Quality 130: Documentation Of Current Medications In The Medical Record: Current Medications Documented Quality 431: Preventive Care And Screening: Unhealthy Alcohol Use - Screening: Patient screened for unhealthy alcohol use using a single question and scores less than 2 times per year Detail Level: Detailed Quality 110: Preventive Care And Screening: Influenza Immunization: Influenza Immunization Administered during Influenza season

## 2024-02-27 NOTE — BRIEF OPERATIVE NOTE - NSICDXBRIEFPREOP_GEN_ALL_CORE_FT
Patient/Caregiver provided printed discharge information. PRE-OP DIAGNOSIS:  Acute appendicitis 07-Jun-2023 08:46:54  John Paul Escobar

## 2024-10-11 ENCOUNTER — APPOINTMENT (OUTPATIENT)
Dept: SURGERY | Facility: CLINIC | Age: 34
End: 2024-10-11

## 2024-11-01 ENCOUNTER — APPOINTMENT (OUTPATIENT)
Dept: SURGERY | Facility: CLINIC | Age: 34
End: 2024-11-01
Payer: COMMERCIAL

## 2024-11-01 VITALS
WEIGHT: 215 LBS | BODY MASS INDEX: 29.12 KG/M2 | TEMPERATURE: 96.2 F | OXYGEN SATURATION: 100 % | HEIGHT: 72 IN | HEART RATE: 96 BPM | SYSTOLIC BLOOD PRESSURE: 105 MMHG | RESPIRATION RATE: 18 BRPM | DIASTOLIC BLOOD PRESSURE: 69 MMHG

## 2024-11-01 DIAGNOSIS — K64.4 RESIDUAL HEMORRHOIDAL SKIN TAGS: ICD-10-CM

## 2024-11-01 DIAGNOSIS — K62.5 HEMORRHAGE OF ANUS AND RECTUM: ICD-10-CM

## 2024-11-01 PROCEDURE — 99213 OFFICE O/P EST LOW 20 MIN: CPT | Mod: 25

## 2024-11-01 PROCEDURE — 46600 DIAGNOSTIC ANOSCOPY SPX: CPT

## 2024-11-01 RX ORDER — TIRZEPATIDE 10 MG/.5ML
10 INJECTION, SOLUTION SUBCUTANEOUS
Refills: 0 | Status: ACTIVE | COMMUNITY

## (undated) DEVICE — DRSG STERISTRIPS 0.5 X 4"

## (undated) DEVICE — MEDICATION LABELS W MARKER

## (undated) DEVICE — DRSG OPSITE 2.5 X 2"

## (undated) DEVICE — TROCAR COVIDIEN VERSAPORT BLADELESS OPTICAL 5MM STANDARD

## (undated) DEVICE — PACK LAP CHOLE LAP APPENDECTOMY

## (undated) DEVICE — SOL IRR POUR H2O 250ML

## (undated) DEVICE — GOWN TRIMAX LG

## (undated) DEVICE — TROCAR COVIDIEN VERSAPORT BLADELESS OPTICAL 12MM STANDARD

## (undated) DEVICE — STAPLER COVIDIEN ENDO GIA STANDARD HANDLE

## (undated) DEVICE — POSITIONER FOAM EGG CRATE ULNAR 2PCS (PINK)

## (undated) DEVICE — DISSECTOR ENDO PEANUT 5MM

## (undated) DEVICE — GLV 8.5 PROTEXIS (WHITE)

## (undated) DEVICE — TUBING STRYKEFLOW II SUCTION / IRRIGATOR

## (undated) DEVICE — GLV 8 PROTEXIS (WHITE)

## (undated) DEVICE — SOL IRR POUR NS 0.9% 500ML

## (undated) DEVICE — SUT MONOCRYL 4-0 18" PS-2

## (undated) DEVICE — INSUFFLATION NDL COVIDIEN STEP 14G FOR STEP/VERSASTEP

## (undated) DEVICE — D HELP - CLEARVIEW CLEARIFY SYSTEM

## (undated) DEVICE — GLV 6.5 PROTEXIS (WHITE)

## (undated) DEVICE — PREP CHLORAPREP HI-LITE ORANGE 26ML

## (undated) DEVICE — SPECIMEN CONTAINER 100ML

## (undated) DEVICE — TUBING INSUFFLATION LAP FILTER 10FT

## (undated) DEVICE — WARMING BLANKET UPPER ADULT

## (undated) DEVICE — GLV 7.5 PROTEXIS (WHITE)

## (undated) DEVICE — LIGASURE MARYLAND 37CM

## (undated) DEVICE — ENDOCATCH 10MM SPECIMEN POUCH

## (undated) DEVICE — DRAPE INSTRUMENT POUCH 6.75" X 11"

## (undated) DEVICE — DRAPE TOWEL BLUE 17" X 24"

## (undated) DEVICE — GLV 7 PROTEXIS (WHITE)

## (undated) DEVICE — VENODYNE/SCD SLEEVE CALF LARGE